# Patient Record
Sex: FEMALE | Race: WHITE | NOT HISPANIC OR LATINO | Employment: FULL TIME | URBAN - NONMETROPOLITAN AREA
[De-identification: names, ages, dates, MRNs, and addresses within clinical notes are randomized per-mention and may not be internally consistent; named-entity substitution may affect disease eponyms.]

---

## 2019-07-20 ENCOUNTER — OFFICE VISIT (OUTPATIENT)
Dept: RETAIL CLINIC | Facility: CLINIC | Age: 34
End: 2019-07-20

## 2019-07-20 VITALS
DIASTOLIC BLOOD PRESSURE: 91 MMHG | SYSTOLIC BLOOD PRESSURE: 137 MMHG | TEMPERATURE: 98.2 F | HEART RATE: 76 BPM | WEIGHT: 265.8 LBS | OXYGEN SATURATION: 99 %

## 2019-07-20 DIAGNOSIS — N30.01 ACUTE CYSTITIS WITH HEMATURIA: Primary | ICD-10-CM

## 2019-07-20 LAB
BILIRUB BLD-MCNC: ABNORMAL MG/DL
CLARITY, POC: CLEAR
COLOR UR: ABNORMAL
GLUCOSE UR STRIP-MCNC: ABNORMAL MG/DL
KETONES UR QL: ABNORMAL
LEUKOCYTE EST, POC: ABNORMAL
NITRITE UR-MCNC: POSITIVE MG/ML
PH UR: 5 [PH] (ref 5–8)
PROT UR STRIP-MCNC: ABNORMAL MG/DL
RBC # UR STRIP: ABNORMAL /UL
SP GR UR: 1.02 (ref 1–1.03)
UROBILINOGEN UR QL: ABNORMAL

## 2019-07-20 PROCEDURE — 81003 URINALYSIS AUTO W/O SCOPE: CPT | Performed by: NURSE PRACTITIONER

## 2019-07-20 PROCEDURE — 99202 OFFICE O/P NEW SF 15 MIN: CPT | Performed by: NURSE PRACTITIONER

## 2019-07-20 RX ORDER — PHENAZOPYRIDINE HYDROCHLORIDE 200 MG/1
200 TABLET, FILM COATED ORAL 3 TIMES DAILY PRN
Qty: 5 TABLET | Refills: 0 | Status: SHIPPED | OUTPATIENT
Start: 2019-07-20 | End: 2019-07-22

## 2019-07-20 RX ORDER — CEPHALEXIN 500 MG/1
500 CAPSULE ORAL 3 TIMES DAILY
Qty: 21 CAPSULE | Refills: 0 | Status: SHIPPED | OUTPATIENT
Start: 2019-07-20 | End: 2019-07-27

## 2019-07-20 RX ORDER — SPIRONOLACTONE 50 MG/1
TABLET, FILM COATED ORAL DAILY
Refills: 2 | COMMUNITY
Start: 2019-07-07

## 2019-07-20 NOTE — PROGRESS NOTES
Subjective   Claudia Reeves is a 33 y.o. female.     Claudia presents today for complains of blood in her urine and painful urination.  She reports she travels a lot for work.  On Monday she was traveling for work and when she arrived at her destination, she felt burning with urination. She did not feel any other symptoms until today.  She is now seeing blood in her urine, having urgency, frequency, and dysuria.  She was recently treated for a UTI on 7/7/19 with 7 days of Bactrim.  She states these symptoms resolved and she was feel well.  Prior to symptoms starting she went swimming in hotel pool which she typically does not do.  She has a history of recurrent UTIs.  She has had stents and her urethra stretched at the age of 17.  The stents are no longer in place.  She has also started Spironolactone about 1 month ago and states she has been trying to keep her fluid intake up.  AZO has been working but has stopped working today.         Urinary Tract Infection    This is a new problem. The current episode started today (Recurrent- treated 3 weeks ago for same symptoms ). The problem has been gradually worsening. The quality of the pain is described as burning. There has been no fever. There is a history of pyelonephritis. Associated symptoms include frequency, hematuria and urgency. Pertinent negatives include no flank pain, nausea or vomiting. Associated symptoms comments: Lower back pain  . Treatments tried: AZO; Finished 7 days of Bactrim on 7/14/19. The treatment provided mild relief. Her past medical history is significant for kidney stones and recurrent UTIs.        The following portions of the patient's history were reviewed and updated as appropriate: allergies, current medications, past family history, past medical history, past social history, past surgical history and problem list.    Review of Systems   Constitutional: Negative for fever.   Gastrointestinal: Negative for nausea and vomiting.    Genitourinary: Positive for dysuria, frequency, hematuria and urgency. Negative for flank pain, vaginal bleeding, vaginal discharge and vaginal pain.   Musculoskeletal: Positive for back pain (Lower back pain).       Objective   Physical Exam   Constitutional: She appears well-developed and well-nourished. She does not appear ill. No distress.   Neck:   No lower back pain during visit today   Cardiovascular: Normal rate and regular rhythm. Exam reveals no gallop and no friction rub.   No murmur heard.  Pulmonary/Chest: Effort normal and breath sounds normal. No stridor. No respiratory distress. She has no decreased breath sounds. She has no wheezes. She has no rhonchi. She has no rales.   Abdominal: Soft. There is tenderness (mild; states she always has this tenderness). There is no CVA tenderness.   Neurological: She is alert.   Skin: Skin is warm and dry. She is not diaphoretic.   Psychiatric: She has a normal mood and affect. Her behavior is normal.         Assessment/Plan   Claudia was seen today for urinary tract infection.    Diagnoses and all orders for this visit:    Acute cystitis with hematuria  -     POC Urinalysis Dipstick, Automated    Other orders  -     cephalexin (KEFLEX) 500 MG capsule; Take 1 capsule by mouth 3 (Three) Times a Day for 7 days.  -     phenazopyridine (PYRIDIUM) 200 MG tablet; Take 1 tablet by mouth 3 (Three) Times a Day As Needed for bladder spasms for up to 2 days.    Due to pyridium use, urine was heavily stained and U/A was not reliable.    Will send urine culture.       Increase fluid intake  Avoid caffeine and carbonation  Follow up with PCP at completion of therapy for recheck  If symptoms worsen or you develop fever, follow up with PCP

## 2019-07-20 NOTE — PATIENT INSTRUCTIONS
Increase fluid intake  Avoid caffeine and carbonation  Follow up with PCP at completion of therapy for recheck  If symptoms worsen or you develop fever, follow up with PCP      Urinary Tract Infection, Adult  A urinary tract infection (UTI) is an infection of any part of the urinary tract. The urinary tract includes the:  · Kidneys.  · Ureters.  · Bladder.  · Urethra.    These organs make, store, and get rid of pee (urine) in the body.  Follow these instructions at home:  · Take over-the-counter and prescription medicines only as told by your doctor.  · If you were prescribed an antibiotic medicine, take it as told by your doctor. Do not stop taking the antibiotic even if you start to feel better.  · Avoid the following drinks:  ? Alcohol.  ? Caffeine.  ? Tea.  ? Carbonated drinks.  · Drink enough fluid to keep your pee clear or pale yellow.  · Keep all follow-up visits as told by your doctor. This is important.  · Make sure to:  ? Empty your bladder often and completely. Do not to hold pee for long periods of time.  ? Empty your bladder before and after sex.  ? Wipe from front to back after a bowel movement if you are female. Use each tissue one time when you wipe.  Contact a doctor if:  · You have back pain.  · You have a fever.  · You feel sick to your stomach (nauseous).  · You throw up (vomit).  · Your symptoms do not get better after 3 days.  · Your symptoms go away and then come back.  Get help right away if:  · You have very bad back pain.  · You have very bad lower belly (abdominal) pain.  · You are throwing up and cannot keep down any medicines or water.  This information is not intended to replace advice given to you by your health care provider. Make sure you discuss any questions you have with your health care provider.  Document Released: 06/05/2009 Document Revised: 06/12/2018 Document Reviewed: 11/07/2016  [employer] Education © 2019 [employer]

## 2019-07-23 ENCOUNTER — TELEPHONE (OUTPATIENT)
Dept: RETAIL CLINIC | Facility: CLINIC | Age: 34
End: 2019-07-23

## 2019-07-23 LAB
BACTERIA UR CULT: ABNORMAL
BACTERIA UR CULT: ABNORMAL
OTHER ANTIBIOTIC SUSC ISLT: ABNORMAL